# Patient Record
Sex: MALE | Race: OTHER | HISPANIC OR LATINO | Employment: UNEMPLOYED | URBAN - METROPOLITAN AREA
[De-identification: names, ages, dates, MRNs, and addresses within clinical notes are randomized per-mention and may not be internally consistent; named-entity substitution may affect disease eponyms.]

---

## 2023-03-19 ENCOUNTER — HOSPITAL ENCOUNTER (EMERGENCY)
Facility: HOSPITAL | Age: 23
Discharge: HOME | End: 2023-03-19
Attending: EMERGENCY MEDICINE

## 2023-03-19 VITALS
SYSTOLIC BLOOD PRESSURE: 132 MMHG | RESPIRATION RATE: 18 BRPM | OXYGEN SATURATION: 100 % | DIASTOLIC BLOOD PRESSURE: 79 MMHG | BODY MASS INDEX: 23.52 KG/M2 | HEIGHT: 65 IN | WEIGHT: 141.13 LBS | TEMPERATURE: 99 F | HEART RATE: 85 BPM

## 2023-03-19 DIAGNOSIS — S67.02XA CRUSHING INJURY OF LEFT THUMB, INITIAL ENCOUNTER: Primary | ICD-10-CM

## 2023-03-19 DIAGNOSIS — S60.312A ABRASION OF LEFT THUMB, INITIAL ENCOUNTER: ICD-10-CM

## 2023-03-19 PROCEDURE — 96372 THER/PROPH/DIAG INJ SC/IM: CPT | Performed by: EMERGENCY MEDICINE

## 2023-03-19 PROCEDURE — 99284 EMERGENCY DEPT VISIT MOD MDM: CPT

## 2023-03-19 PROCEDURE — 25000003 PHARM REV CODE 250: Performed by: EMERGENCY MEDICINE

## 2023-03-19 PROCEDURE — 90471 IMMUNIZATION ADMIN: CPT | Performed by: EMERGENCY MEDICINE

## 2023-03-19 PROCEDURE — 63600175 PHARM REV CODE 636 W HCPCS: Performed by: EMERGENCY MEDICINE

## 2023-03-19 PROCEDURE — 90715 TDAP VACCINE 7 YRS/> IM: CPT | Performed by: EMERGENCY MEDICINE

## 2023-03-19 RX ORDER — TRAMADOL HYDROCHLORIDE 50 MG/1
100 TABLET ORAL EVERY 12 HOURS PRN
Qty: 20 TABLET | Refills: 0 | Status: SHIPPED | OUTPATIENT
Start: 2023-03-19

## 2023-03-19 RX ORDER — HYDROCODONE BITARTRATE AND ACETAMINOPHEN 5; 325 MG/1; MG/1
2 TABLET ORAL
Status: COMPLETED | OUTPATIENT
Start: 2023-03-19 | End: 2023-03-19

## 2023-03-19 RX ORDER — CEFAZOLIN SODIUM 1 G/3ML
1 INJECTION, POWDER, FOR SOLUTION INTRAMUSCULAR; INTRAVENOUS
Status: COMPLETED | OUTPATIENT
Start: 2023-03-19 | End: 2023-03-19

## 2023-03-19 RX ORDER — NAPROXEN 500 MG/1
500 TABLET ORAL 2 TIMES DAILY PRN
Qty: 10 TABLET | Refills: 1 | Status: SHIPPED | OUTPATIENT
Start: 2023-03-19 | End: 2023-03-24

## 2023-03-19 RX ORDER — NAPROXEN 250 MG/1
500 TABLET ORAL
Status: COMPLETED | OUTPATIENT
Start: 2023-03-19 | End: 2023-03-19

## 2023-03-19 RX ADMIN — CEFAZOLIN 1 G: 330 INJECTION, POWDER, FOR SOLUTION INTRAMUSCULAR; INTRAVENOUS at 11:03

## 2023-03-19 RX ADMIN — BACITRACIN ZINC, NEOMYCIN, POLYMYXIN B SULFAT 1 EACH: 5000; 3.5; 4 OINTMENT TOPICAL at 11:03

## 2023-03-19 RX ADMIN — TETANUS TOXOID, REDUCED DIPHTHERIA TOXOID AND ACELLULAR PERTUSSIS VACCINE, ADSORBED 0.5 ML: 5; 2.5; 8; 8; 2.5 SUSPENSION INTRAMUSCULAR at 11:03

## 2023-03-19 RX ADMIN — NAPROXEN 500 MG: 250 TABLET ORAL at 11:03

## 2023-03-19 RX ADMIN — HYDROCODONE BITARTRATE AND ACETAMINOPHEN 2 TABLET: 5; 325 TABLET ORAL at 12:03

## 2023-03-19 NOTE — ED PROVIDER NOTES
Encounter Date: 3/19/2023       History     Chief Complaint   Patient presents with    Hand Injury     Lt thumb injury      He is here with his sister-in-law who is translating, [place]-speaking only, here from [place] for about 8 months.  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  Unknown last tetanus booster, greater than 5 years ago.  Denies significant past medical or surgical history, medications, allergies, or other relevant history.  No other complaints.    The history is provided by the patient and a relative. The history is limited by a language barrier. A  was used.   Review of patient's allergies indicates:  No Known Allergies  History reviewed. No pertinent past medical history.  No past surgical history on file.  History reviewed. No pertinent family history.     Review of Systems   Constitutional:  Negative for chills and fever.   HENT:  Negative for congestion, facial swelling, nosebleeds and sinus pressure.    Eyes:  Negative for pain and redness.   Respiratory:  Negative for chest tightness, shortness of breath and wheezing.    Cardiovascular:  Negative for chest pain, palpitations and leg swelling.   Gastrointestinal:  Negative for abdominal distention, abdominal pain, diarrhea, nausea and vomiting.   Endocrine: Negative for cold intolerance, polydipsia and polyphagia.   Genitourinary:  Negative for difficulty urinating, dysuria, frequency and hematuria.   Musculoskeletal:  Negative for arthralgias, back pain, myalgias and neck pain.        See HPI   Skin:  Positive for wound. Negative for color change and rash.        See HPI   Neurological:  Negative for dizziness, weakness, numbness and headaches.   Hematological:  Negative for adenopathy. Does not bruise/bleed easily.   Psychiatric/Behavioral:  Negative for agitation and behavioral problems.    All other  systems reviewed and are negative.    Physical Exam     Initial Vitals [03/19/23 1044]   BP Pulse Resp Temp SpO2   (!) 148/77 90 16 98.7 °F (37.1 °C) 100 %      MAP       --         Physical Exam    Nursing note and vitals reviewed.  Constitutional: He appears well-developed and well-nourished. He is not diaphoretic. He appears distressed.   Mild discomfort, tolerating injury well.   HENT:   Head: Normocephalic and atraumatic.   Mouth/Throat: Oropharynx is clear and moist. No oropharyngeal exudate.   Eyes: Conjunctivae and EOM are normal. Pupils are equal, round, and reactive to light. Right eye exhibits no discharge. Left eye exhibits no discharge. No scleral icterus.   Neck: Neck supple. No thyromegaly present. No tracheal deviation present. No JVD present.   Normal range of motion.  Cardiovascular:  Normal rate, regular rhythm and normal heart sounds.     Exam reveals no gallop and no friction rub.       No murmur heard.  Pulmonary/Chest: Breath sounds normal. No respiratory distress. He has no wheezes. He has no rhonchi. He has no rales. He exhibits no tenderness.   Abdominal: Abdomen is soft. Bowel sounds are normal. He exhibits no distension and no mass. There is no abdominal tenderness. There is no rebound and no guarding.   Musculoskeletal:         General: Tenderness and edema present.      Cervical back: Normal range of motion and neck supple.      Comments: Moderate changes of recent crush injury to distal phalanx of left thumb with mild subungual hematoma, no elevation of nailbed, minor scattered areas of abrasion or tiny lacerations but no significant open laceration or active bleeding.  There is moderate ecchymosis and swelling of most of the distal phalanx, fairly well-preserved range of motion at the IP joint, intact distal capillary refill.     Lymphadenopathy:     He has no cervical adenopathy.   Neurological: He is alert and oriented to person, place, and time. He has normal strength. No cranial  nerve deficit.   Skin: Skin is warm and dry. No rash noted. No erythema.   See above   Psychiatric: He has a normal mood and affect. His behavior is normal. Judgment and thought content normal.       ED Course   Procedures  Labs Reviewed - No data to display       Imaging Results              X-Ray Finger 2 or More Views Left (Final result)  Result time 03/19/23 12:08:37      Final result by Sabi Garcias MD (03/19/23 12:08:37)                   Impression:      No acute osseous abnormality.      Electronically signed by: Sabi Garcias  Date:    03/19/2023  Time:    12:08               Narrative:    EXAMINATION:  XR FINGER 2 OR MORE VIEWS LEFT    CLINICAL HISTORY:  trauma left thumb;    TECHNIQUE:  Three views of the  digit of the left hand were obtained    COMPARISON:  None.    FINDINGS:  No fracture. No dislocation.    Soft tissue swelling.                                       Medications   neomycin-bacitracnZn-polymyxnB packet (1 each Topical (Top) Given 3/19/23 1142)   Tdap (BOOSTRIX) vaccine injection 0.5 mL (0.5 mLs Intramuscular Given 3/19/23 1141)   ceFAZolin injection 1 g (1 g Intramuscular Given 3/19/23 1137)   HYDROcodone-acetaminophen 5-325 mg per tablet 2 tablet (2 tablets Oral Given 3/19/23 1211)   naproxen tablet 500 mg (500 mg Oral Given 3/19/23 1140)       12:30 PM Counseled pt & family/ ready for d/c.      Medical Decision Making  Problems Addressed:  Abrasion of left thumb, initial encounter: acute illness or injury  Crushing injury of left thumb, initial encounter: acute illness or injury    Amount and/or Complexity of Data Reviewed  Radiology: ordered. Decision-making details documented in ED Course.    Risk  Prescription drug management.           Additional MDM:   Differential Diagnosis:   Fracture/ laceration/ subungual hematoma                        Clinical Impression:   Final diagnoses:  [S67.02XA] Crushing injury of left thumb, initial encounter (Primary)  [S60.312A] Abrasion  of left thumb, initial encounter        ED Disposition Condition    Discharge Stable          ED Prescriptions       Medication Sig Dispense Start Date End Date Auth. Provider    naproxen (NAPROSYN) 500 MG tablet Take 1 tablet (500 mg total) by mouth 2 (two) times daily as needed (For pain/ inflammation). 10 tablet 3/19/2023 3/24/2023 Aashish Castro MD    traMADoL (ULTRAM) 50 mg tablet Take 2 tablets (100 mg total) by mouth every 12 (twelve) hours as needed for Pain. 20 tablet 3/19/2023 -- Aashish Castro MD          Follow-up Information       Follow up With Specialties Details Why Contact Info    [facility] Dept Emergency Medicine  As needed [address] [facility] [id number] [phone]             Aashish Castro MD  03/19/23 3107

## 2023-03-19 NOTE — Clinical Note
"Carlos Coto" Hay Wolfe was seen and treated in our emergency department on 3/19/2023.  He may return to work on 03/23/2023.       If you have any questions or concerns, please don't hesitate to call.      Aashish Castro MD"